# Patient Record
Sex: FEMALE
[De-identification: names, ages, dates, MRNs, and addresses within clinical notes are randomized per-mention and may not be internally consistent; named-entity substitution may affect disease eponyms.]

---

## 2019-03-27 PROBLEM — Z00.00 ENCOUNTER FOR PREVENTIVE HEALTH EXAMINATION: Status: ACTIVE | Noted: 2019-03-27

## 2019-10-03 ENCOUNTER — APPOINTMENT (OUTPATIENT)
Dept: OBGYN | Facility: CLINIC | Age: 34
End: 2019-10-03

## 2021-04-08 ENCOUNTER — APPOINTMENT (OUTPATIENT)
Dept: URBAN - METROPOLITAN AREA SURGERY 18 | Age: 36
Setting detail: DERMATOLOGY
End: 2021-04-12

## 2021-04-08 VITALS — TEMPERATURE: 98.2 F

## 2021-04-08 DIAGNOSIS — Z71.89 OTHER SPECIFIED COUNSELING: ICD-10-CM

## 2021-04-08 DIAGNOSIS — L82.0 INFLAMED SEBORRHEIC KERATOSIS: ICD-10-CM

## 2021-04-08 DIAGNOSIS — L81.4 OTHER MELANIN HYPERPIGMENTATION: ICD-10-CM

## 2021-04-08 PROCEDURE — 99203 OFFICE O/P NEW LOW 30 MIN: CPT

## 2021-04-08 PROCEDURE — OTHER TREATMENT REGIMEN: OTHER

## 2021-04-08 PROCEDURE — OTHER SUNSCREEN RECOMMENDATIONS: OTHER

## 2021-04-08 PROCEDURE — OTHER COUNSELING: OTHER

## 2021-04-08 ASSESSMENT — LOCATION SIMPLE DESCRIPTION DERM: LOCATION SIMPLE: LEFT CHEEK

## 2021-04-08 ASSESSMENT — LOCATION DETAILED DESCRIPTION DERM
LOCATION DETAILED: LEFT SUPERIOR MEDIAL MALAR CHEEK
LOCATION DETAILED: LEFT SUPERIOR CENTRAL BUCCAL CHEEK

## 2021-04-08 ASSESSMENT — LOCATION ZONE DERM: LOCATION ZONE: FACE

## 2021-04-08 NOTE — HPI: SKIN LESIONS
How Severe Is Your Skin Lesion?: moderate
Is This A New Presentation, Or A Follow-Up?: Skin Lesions
Additional History: Pt is accompanied by  which was provided by our office

## 2022-07-29 ENCOUNTER — EMERGENCY (EMERGENCY)
Facility: HOSPITAL | Age: 37
LOS: 1 days | Discharge: HOME | End: 2022-07-29
Attending: EMERGENCY MEDICINE | Admitting: EMERGENCY MEDICINE
Payer: MEDICAID

## 2022-07-29 VITALS
HEART RATE: 84 BPM | HEIGHT: 62 IN | RESPIRATION RATE: 18 BRPM | OXYGEN SATURATION: 100 % | WEIGHT: 199.96 LBS | TEMPERATURE: 99 F | DIASTOLIC BLOOD PRESSURE: 74 MMHG | SYSTOLIC BLOOD PRESSURE: 102 MMHG

## 2022-07-29 LAB — HCG UR QL: NEGATIVE — SIGNIFICANT CHANGE UP

## 2022-07-29 PROCEDURE — 99283 EMERGENCY DEPT VISIT LOW MDM: CPT | Mod: 25

## 2022-07-29 PROCEDURE — 99284 EMERGENCY DEPT VISIT MOD MDM: CPT

## 2022-07-29 PROCEDURE — 96372 THER/PROPH/DIAG INJ SC/IM: CPT

## 2022-07-29 PROCEDURE — 81025 URINE PREGNANCY TEST: CPT

## 2022-07-29 RX ORDER — KETOROLAC TROMETHAMINE 30 MG/ML
30 SYRINGE (ML) INJECTION ONCE
Refills: 0 | Status: DISCONTINUED | OUTPATIENT
Start: 2022-07-29 | End: 2022-07-29

## 2022-07-29 RX ORDER — CIPROFLOXACIN AND DEXAMETHASONE 3; 1 MG/ML; MG/ML
4 SUSPENSION/ DROPS AURICULAR (OTIC)
Qty: 1 | Refills: 0
Start: 2022-07-29 | End: 2022-08-04

## 2022-07-29 RX ORDER — DEXAMETHASONE 0.5 MG/5ML
10 ELIXIR ORAL ONCE
Refills: 0 | Status: COMPLETED | OUTPATIENT
Start: 2022-07-29 | End: 2022-07-29

## 2022-07-29 RX ADMIN — Medication 10 MILLIGRAM(S): at 16:17

## 2022-07-29 RX ADMIN — Medication 30 MILLIGRAM(S): at 16:17

## 2022-07-29 NOTE — ED PROVIDER NOTE - NSFOLLOWUPINSTRUCTIONS_ED_ALL_ED_FT
1) Take motrin and tyelnol for pain  2) Stop taking bactrim, will change antibiotic to levaquin  3) Use ciprodex drops in bilateral ears  4) Take steroids as prescribed  5) FOllow up with ENT office Monday or Tuesday    Otitis Externa    Otitis externa is a bacterial or fungal infection of the outer ear canal. This is the area from the eardrum to the outside of the ear. Otitis externa is sometimes called "swimmer's ear." Causes include swimming in dirty water, moisture remaining in ear after swimming or bathing, trauma to the ear, objects stuck in the ear, or cuts or scrapes in the outside of the ear. Symptoms include itching, pain, swelling, redness in the ear canal, and fluid coming from the ear. To prevent recurrence of otitis externa, keep your ear dry, avoid scratching or putting objects inside your ear including cotton swabs, and avoid swimming in polluted water or poorly chlorinated pools.    SEEK IMMEDIATE MEDICAL CARE IF YOU HAVE ANY OF THE FOLLOWING SYMPTOMS: fever, worsening symptoms, headache, redness/swelling/pain over the bone behind your ear.

## 2022-07-29 NOTE — ED PROVIDER NOTE - NS ED ATTENDING STATEMENT MOD
This was a shared visit with the LILLI. I reviewed and verified the documentation and independently performed the documented:

## 2022-07-29 NOTE — ED ADULT NURSE NOTE - OBJECTIVE STATEMENT
pt arrived c/o ear pain, pt states pain is BL though worse to R ear.  Denies recent contact with pool/swimming.   Denies dizziness.  Respirations even and unlabored will continue to monitor. BN

## 2022-07-29 NOTE — ED ADULT TRIAGE NOTE - CHIEF COMPLAINT QUOTE
Right sided ear that has now advances to left ear due to a piercing. Pt was placed on bactrim and now c/o fatigue and multisystemic complaints.

## 2022-07-29 NOTE — ED PROVIDER NOTE - NSICDXFAMILYHX_GEN_ALL_CORE_FT
FAMILY HISTORY:  Father  Still living? Yes, Estimated age: 61-70  Family history of cerebral embolic infarction, Age at diagnosis: 51-60    Sibling  Still living? Yes, Estimated age: 11-20  Family history of testicular cancer, Age at diagnosis: 11-20    Grandparent  Still living? No  Family history of breast cancer, Age at diagnosis: Age Unknown

## 2022-07-29 NOTE — ED PROVIDER NOTE - PATIENT PORTAL LINK FT
You can access the FollowMyHealth Patient Portal offered by [facility] by registering at the following website: [email or web address]. By joining [facility] FollowMyHealth portal, you will also be able to view your health information using other applications (apps) compatible with our system.

## 2022-07-29 NOTE — ED PROVIDER NOTE - OBJECTIVE STATEMENT
37-year-old female with no significant medical history presents to the ED complaining of bilateral ear pain since Tuesday.  Patient states pain was initially more right-sided ear now is more on the left.  Patient recently had new ear piercing to her right ear that she took out yesterday.  Patient went to urgent care on Wednesday and was given Bactrim with minimal improvement.  Patient states she follows up with ENT office in [place] for ear pain and states she has been told she has eczema in her ears.  Patient also has history of salivary gland stones which she is following.  Patient states both of her ear canals are swollen with discharge.  Denies fever, chills, headache, chest pain, shortness of breath, abdominal pain, nausea, vomiting. + decreased hearing bilaterally.

## 2022-07-29 NOTE — ED PROVIDER NOTE - CARE PROVIDER_API CALL
Rocky Campos)  Otolaryngology  [address] [place] [address]  Phone: [phone]  Fax: [phone]  Follow Up Time:

## 2022-07-29 NOTE — ED PROVIDER NOTE - ATTENDING APP SHARED VISIT CONTRIBUTION OF CARE
37-year-old female presents to the emergency department complaining of bilateral ear pain patient states the pain started in the right ear patient had recent ear piercing as well in which ear piercing was removed patient had gone to urgent care and was prescribed Bactrim.  Patient also reports that her left ear now started to have pain as well patient feels inflammation and redness is spreading and getting worse and patient came to the emergency department.  Patient has history of eczema of her bilateral ears and is followed by ENT.  Unable to see tympanic membrane on bilateral ears due to inflammation of external canal concerning for otitis externa.  To call ENT and give patient pain control and antibiotics.

## 2022-07-29 NOTE — ED PROVIDER NOTE - PROGRESS NOTE DETAILS
Case discussed with covering ENT for ENT office in [place], Dr. Parson, recommends change abx to levaquin, ciprodex and low dose prednisone, follow up in the office Monday or Tuesday. Case discussed with covering ENT for ENT office in [place], Dr. Parson, recommends change abx to levaquin, ciprodex and low dose prednisone, follow up in the office Monday or Tuesday. Plan discussed with patient, agreeable with plan and understands strict return precautions.

## 2022-10-28 ENCOUNTER — APPOINTMENT (OUTPATIENT)
Dept: OTOLARYNGOLOGY | Facility: CLINIC | Age: 37
End: 2022-10-28

## 2022-11-15 ENCOUNTER — APPOINTMENT (OUTPATIENT)
Dept: OTOLARYNGOLOGY | Facility: CLINIC | Age: 37
End: 2022-11-15

## 2023-03-06 ENCOUNTER — APPOINTMENT (OUTPATIENT)
Dept: OTOLARYNGOLOGY | Facility: CLINIC | Age: 38
End: 2023-03-06
Payer: MEDICAID

## 2023-03-06 ENCOUNTER — NON-APPOINTMENT (OUTPATIENT)
Age: 38
End: 2023-03-06

## 2023-03-06 VITALS
HEIGHT: 62 IN | HEART RATE: 67 BPM | WEIGHT: 200 LBS | DIASTOLIC BLOOD PRESSURE: 78 MMHG | SYSTOLIC BLOOD PRESSURE: 107 MMHG | BODY MASS INDEX: 36.8 KG/M2 | TEMPERATURE: 97.6 F

## 2023-03-06 PROCEDURE — 99204 OFFICE O/P NEW MOD 45 MIN: CPT | Mod: 25

## 2023-03-06 PROCEDURE — 31231 NASAL ENDOSCOPY DX: CPT

## 2023-03-06 NOTE — PHYSICAL EXAM
[Nasal Endoscopy Performed] : nasal endoscopy was performed, see procedure section for findings [] : septum deviated to the right [Normal] : no abnormal secretions [de-identified] : stone in mid duct and hilum on R side

## 2023-03-06 NOTE — PROCEDURE
[FreeTextEntry6] : Procedure performed: Nasal Endoscopy- Diagnostic\par Pre-op indication(s): nasal congestion\par Post-op indication(s): nasal congestion \par Verbal and/or written consent obtained from patient\par Anterior rhinoscopy insufficient to account for symptoms\par Scope #: 3,  flexible fiber optic telescope \par The scope was introduced in the nasal passage between the middle and inferior turbinates to exam the inferior portion of the middle meatus and the fontanelle, as well as the maxillary ostia.  Next, the scope was passed medically and posteriorly to the middle turbinates to examine the sphenoethmoid recess and the superior turbinate region.\par Upon visualization the finders are as follows:\par Nasal Septum: sigmoidal septal deviation more on R \par Bilateral - Mucosa: boggy turbinates, Mucous: scant, Polyp: not seen, Inferior Turbinate: boggy, Middle Turbinate: normal, Superior Turbinate: normal, Inferior Meatus: narrow, Middle Meatus: narrow, Super Meatus:normal, Sphenoethmoidal Recess: clear\par

## 2023-03-06 NOTE — ASSESSMENT
[FreeTextEntry1] : 37 year old female presents with dryness from nose and throat. On exam, pt presents with sigmoidal septal deviation more to R, but otherwise benign exam. \par -discussed Ponaris oil and saline washes to see if moisturization helps with dryness\par \par Pt also presents with multiple stones right SMG duct hat are symptomatic and very bothersome, refractory to conservative tx \par - discussed potential for salivary surgery to open duct and remove stone\par - CT report from [facility] discussed and reviewed which showed stone on R side\par -Discussed options for recurrent submandibular sialoadenitis- observation with conservative management versus interventional sialoendoscopy vs excision of gland. \par Discussed details of the regiment/procedures and risks of each including but not limited to:\par office sialodochoplasty/ stone removal - bleeding, infection, scarring, inability to remove stone, ductal perforation/avulsion, injury to surrounding nerves, seroma, persistent sx need for further intervention\par interventional sialoendoscopy - higher yield and better precision with stone removal and dilation than preforming without direct visualization in the office however will be done in the operating room- bleeding, infection, scarring, inability to remove stone, ductal perforation/avulsion, injury to surrounding nerves, seroma, persistent sx \par Combined approach - increased risk of bleeding and infections injury to surrounding nerves including lingual, hypoglossal and sensory nerves as well as megaduct with large stone \par complete gland removal - external scar, injury to marginal mandibular nerve etc. \par Would like to do sialoendoscopy. Will book for procedure.\par deeper stones if cannot remove will leave and see if sx resolve, if not may need to remove gland at a later time \par

## 2023-03-06 NOTE — HISTORY OF PRESENT ILLNESS
[de-identified] : Pt complains of dry mouth in the morning, swelling and bad taste in mouth since 2017\par \par Patient also has stuffy and dry nose extending to throat since 2017. Denies dry eyes. Denies allergies, denies allergy testing in past. Pt also denies hx of sinus infections and sinus pressure. \par \par Pt states had infection of an ear piercing in past which cleared spontaneously.

## 2023-05-31 ENCOUNTER — APPOINTMENT (OUTPATIENT)
Dept: OTOLARYNGOLOGY | Facility: HOSPITAL | Age: 38
End: 2023-05-31

## 2023-06-15 ENCOUNTER — APPOINTMENT (OUTPATIENT)
Dept: OTOLARYNGOLOGY | Facility: CLINIC | Age: 38
End: 2023-06-15

## 2024-01-22 ENCOUNTER — APPOINTMENT (OUTPATIENT)
Dept: OTOLARYNGOLOGY | Facility: CLINIC | Age: 39
End: 2024-01-22
Payer: MEDICAID

## 2024-01-22 VITALS — BODY MASS INDEX: 36.8 KG/M2 | WEIGHT: 200 LBS | HEIGHT: 62 IN

## 2024-01-22 DIAGNOSIS — L29.9 PRURITUS, UNSPECIFIED: ICD-10-CM

## 2024-01-22 DIAGNOSIS — J34.89 OTHER SPECIFIED DISORDERS OF NOSE AND NASAL SINUSES: ICD-10-CM

## 2024-01-22 DIAGNOSIS — K11.20 SIALOADENITIS, UNSPECIFIED: ICD-10-CM

## 2024-01-22 DIAGNOSIS — K11.5 SIALOLITHIASIS: ICD-10-CM

## 2024-01-22 DIAGNOSIS — J34.2 DEVIATED NASAL SEPTUM: ICD-10-CM

## 2024-01-22 PROCEDURE — 99214 OFFICE O/P EST MOD 30 MIN: CPT | Mod: 25

## 2024-01-22 PROCEDURE — 31231 NASAL ENDOSCOPY DX: CPT

## 2024-01-22 RX ORDER — MOMETASONE FUROATE 1 MG/G
0.1 OINTMENT TOPICAL
Qty: 1 | Refills: 0 | Status: ACTIVE | COMMUNITY
Start: 2024-01-22 | End: 1900-01-01

## 2024-01-22 NOTE — HISTORY OF PRESENT ILLNESS
[de-identified] : Pt complains of dry mouth in the morning, swelling and bad taste in mouth since 2017 gets right SMG swelling- intermittently. very bothersome, not just with eating, feels like stone shifts and blocks need to massage out gland every morning or is full and uncomfortable - gets bitter stuff out     Patient also has stuffy and dry nose extending to throat since 2017. Denies dry eyes. Denies allergies, denies allergy testing in past. Pt also denies hx of sinus infections and sinus pressure.   itching ear b/l  no Vertigo, tinnitus, pain, drainage or facial weakness.

## 2024-01-22 NOTE — PROCEDURE
[FreeTextEntry6] : Procedure performed: Nasal Endoscopy- Diagnostic Pre-op indication(s): nasal congestion Post-op indication(s): nasal congestion  Verbal and/or written consent obtained from patient Anterior rhinoscopy insufficient to account for symptoms Scope #: 3,  flexible fiber optic telescope  The scope was introduced in the nasal passage between the middle and inferior turbinates to exam the inferior portion of the middle meatus and the fontanelle, as well as the maxillary ostia.  Next, the scope was passed medically and posteriorly to the middle turbinates to examine the sphenoethmoid recess and the superior turbinate region. Upon visualization the finders are as follows: Nasal Septum: sigmoidal septal deviation more on R  Bilateral - Mucosa: boggy turbinates, Mucous: scant, Polyp: not seen, Inferior Turbinate: boggy, Middle Turbinate: normal, Superior Turbinate: normal, Inferior Meatus: narrow, Middle Meatus: narrow, Super Meatus:normal, Sphenoethmoidal Recess: clear

## 2024-01-22 NOTE — PHYSICAL EXAM
[Nasal Endoscopy Performed] : nasal endoscopy was performed, see procedure section for findings [] : septum deviated to the right [Normal] : no abnormal secretions [de-identified] : stone in mid duct and hilum on R side

## 2024-01-22 NOTE — ASSESSMENT
[FreeTextEntry1] : dry nose saline gel  right SMG stones with chronic silaoadneitis conservative tx remove distal stone in office remove both with sialodocoplasty and sialoendoscpy in the OR  Discussed options for recurrent submandibular sialoadenitis- observation with conservative management versus interventional sialoendoscopy vs excision of gland.  Discussed details of the regiment/procedures and risks of each including but not limited to: office sialodochoplasty/ stone removal - bleeding, infection, scarring, inability to remove stone, ductal perforation/avulsion, injury to surrounding nerves, seroma, persistent sx need for further intervention interventional sialoendoscopy - higher yield and better precision with stone removal and dilation than preforming without direct visualization in the office however will be done in the operating room- bleeding, infection, scarring, inability to remove stone, ductal perforation/avulsion, injury to surrounding nerves, seroma, persistent sx  Combined approach - increased risk of bleeding and infections injury to surrounding nerves including lingual, hypoglossal and sensory nerves as well as megaduct with large stone  complete gland removal - external scar, injury to marginal mandibular nerve etc.  Would like to do sialoendoscopy and combined approach to stone removal. understands is chance cannot get deep stone, will see if removal of more distal stone and dilation helps and remove gland later if still Sx . Will book for procedure.   ear itching - ear hygiene drops as needed

## 2024-11-19 ENCOUNTER — APPOINTMENT (OUTPATIENT)
Dept: RHEUMATOLOGY | Facility: CLINIC | Age: 39
End: 2024-11-19